# Patient Record
Sex: FEMALE | Race: WHITE | Employment: UNEMPLOYED | URBAN - METROPOLITAN AREA
[De-identification: names, ages, dates, MRNs, and addresses within clinical notes are randomized per-mention and may not be internally consistent; named-entity substitution may affect disease eponyms.]

---

## 2019-05-08 LAB
ANTIBODY SCREEN, EXTERNAL: NEGATIVE
HBSAG, EXTERNAL: NEGATIVE
HCT, EXTERNAL: 37
HGB, EXTERNAL: 12.8
HIV, EXTERNAL: NONREACTIVE
PLATELET CNT,   EXTERNAL: 276
RPR, EXTERNAL: NONREACTIVE
RUBELLA, EXTERNAL: NORMAL
URINALYSIS, EXTERNAL: NEGATIVE

## 2019-10-14 LAB
CHLAMYDIA, EXTERNAL: NEGATIVE
CHLAMYDIA, EXTERNAL: NEGATIVE
GRBS, EXTERNAL: POSITIVE
N. GONORRHEA, EXTERNAL: NEGATIVE
N. GONORRHEA, EXTERNAL: NEGATIVE

## 2019-10-28 LAB — TYPE, ABO & RH, EXTERNAL: NORMAL

## 2019-11-17 ENCOUNTER — HOSPITAL ENCOUNTER (INPATIENT)
Age: 24
LOS: 3 days | Discharge: HOME | DRG: 560 | End: 2019-11-20
Attending: OBSTETRICS & GYNECOLOGY | Admitting: OBSTETRICS & GYNECOLOGY
Payer: MEDICAID

## 2019-11-17 ENCOUNTER — ANESTHESIA (OUTPATIENT)
Dept: LABOR AND DELIVERY | Age: 24
DRG: 560 | End: 2019-11-17
Payer: MEDICAID

## 2019-11-17 ENCOUNTER — ANESTHESIA EVENT (OUTPATIENT)
Dept: LABOR AND DELIVERY | Age: 24
DRG: 560 | End: 2019-11-17
Payer: MEDICAID

## 2019-11-17 PROBLEM — O48.0 POST TERM PREGNANCY OVER 40 WEEKS: Status: ACTIVE | Noted: 2019-11-17

## 2019-11-17 LAB
BASOPHILS # BLD: 0 K/UL (ref 0–0.1)
BASOPHILS NFR BLD: 0 % (ref 0–1)
COMMENT, HOLDF: NORMAL
DIFFERENTIAL METHOD BLD: ABNORMAL
EOSINOPHIL # BLD: 0.1 K/UL (ref 0–0.4)
EOSINOPHIL NFR BLD: 1 % (ref 0–7)
ERYTHROCYTE [DISTWIDTH] IN BLOOD BY AUTOMATED COUNT: 12.9 % (ref 11.5–14.5)
HCT VFR BLD AUTO: 39.2 % (ref 35–47)
HGB BLD-MCNC: 13.1 G/DL (ref 11.5–16)
IMM GRANULOCYTES # BLD AUTO: 0.1 K/UL (ref 0–0.04)
IMM GRANULOCYTES NFR BLD AUTO: 1 % (ref 0–0.5)
LYMPHOCYTES # BLD: 1.3 K/UL (ref 0.8–3.5)
LYMPHOCYTES NFR BLD: 15 % (ref 12–49)
MCH RBC QN AUTO: 29.3 PG (ref 26–34)
MCHC RBC AUTO-ENTMCNC: 33.4 G/DL (ref 30–36.5)
MCV RBC AUTO: 87.7 FL (ref 80–99)
MONOCYTES # BLD: 0.7 K/UL (ref 0–1)
MONOCYTES NFR BLD: 8 % (ref 5–13)
NEUTS SEG # BLD: 6.5 K/UL (ref 1.8–8)
NEUTS SEG NFR BLD: 75 % (ref 32–75)
NRBC # BLD: 0 K/UL (ref 0–0.01)
NRBC BLD-RTO: 0 PER 100 WBC
PLATELET # BLD AUTO: 249 K/UL (ref 150–400)
PMV BLD AUTO: 10.8 FL (ref 8.9–12.9)
RBC # BLD AUTO: 4.47 M/UL (ref 3.8–5.2)
SAMPLES BEING HELD,HOLD: NORMAL
WBC # BLD AUTO: 8.6 K/UL (ref 3.6–11)

## 2019-11-17 PROCEDURE — 75410000003 HC RECOV DEL/VAG/CSECN EA 0.5 HR: Performed by: OBSTETRICS & GYNECOLOGY

## 2019-11-17 PROCEDURE — 76060000078 HC EPIDURAL ANESTHESIA: Performed by: STUDENT IN AN ORGANIZED HEALTH CARE EDUCATION/TRAINING PROGRAM

## 2019-11-17 PROCEDURE — 74011250637 HC RX REV CODE- 250/637: Performed by: OBSTETRICS & GYNECOLOGY

## 2019-11-17 PROCEDURE — 83615 LACTATE (LD) (LDH) ENZYME: CPT

## 2019-11-17 PROCEDURE — 65270000029 HC RM PRIVATE

## 2019-11-17 PROCEDURE — 75410000002 HC LABOR FEE PER 1 HR: Performed by: OBSTETRICS & GYNECOLOGY

## 2019-11-17 PROCEDURE — 74011000250 HC RX REV CODE- 250: Performed by: STUDENT IN AN ORGANIZED HEALTH CARE EDUCATION/TRAINING PROGRAM

## 2019-11-17 PROCEDURE — 74011250636 HC RX REV CODE- 250/636: Performed by: STUDENT IN AN ORGANIZED HEALTH CARE EDUCATION/TRAINING PROGRAM

## 2019-11-17 PROCEDURE — 74011250636 HC RX REV CODE- 250/636: Performed by: OBSTETRICS & GYNECOLOGY

## 2019-11-17 PROCEDURE — 36415 COLL VENOUS BLD VENIPUNCTURE: CPT

## 2019-11-17 PROCEDURE — 80053 COMPREHEN METABOLIC PANEL: CPT

## 2019-11-17 PROCEDURE — 74011000258 HC RX REV CODE- 258: Performed by: OBSTETRICS & GYNECOLOGY

## 2019-11-17 PROCEDURE — 85025 COMPLETE CBC W/AUTO DIFF WBC: CPT

## 2019-11-17 PROCEDURE — 84156 ASSAY OF PROTEIN URINE: CPT

## 2019-11-17 PROCEDURE — 75410000000 HC DELIVERY VAGINAL/SINGLE: Performed by: OBSTETRICS & GYNECOLOGY

## 2019-11-17 PROCEDURE — 84550 ASSAY OF BLOOD/URIC ACID: CPT

## 2019-11-17 RX ORDER — SODIUM CHLORIDE 0.9 % (FLUSH) 0.9 %
5-40 SYRINGE (ML) INJECTION EVERY 8 HOURS
Status: DISCONTINUED | OUTPATIENT
Start: 2019-11-17 | End: 2019-11-19

## 2019-11-17 RX ORDER — NALOXONE HYDROCHLORIDE 0.4 MG/ML
0.4 INJECTION, SOLUTION INTRAMUSCULAR; INTRAVENOUS; SUBCUTANEOUS ONCE
Status: ACTIVE | OUTPATIENT
Start: 2019-11-17 | End: 2019-11-18

## 2019-11-17 RX ORDER — LIDOCAINE HYDROCHLORIDE AND EPINEPHRINE 15; 5 MG/ML; UG/ML
INJECTION, SOLUTION EPIDURAL
Status: SHIPPED | OUTPATIENT
Start: 2019-11-17 | End: 2019-11-17

## 2019-11-17 RX ORDER — MINERAL OIL
30 OIL (ML) ORAL
Status: COMPLETED | OUTPATIENT
Start: 2019-11-17 | End: 2019-11-18

## 2019-11-17 RX ORDER — CALCIUM CARBONATE 200(500)MG
400 TABLET,CHEWABLE ORAL
Status: DISCONTINUED | OUTPATIENT
Start: 2019-11-17 | End: 2019-11-18 | Stop reason: HOSPADM

## 2019-11-17 RX ORDER — ACETAMINOPHEN 325 MG/1
650 TABLET ORAL
Status: DISCONTINUED | OUTPATIENT
Start: 2019-11-17 | End: 2019-11-18

## 2019-11-17 RX ORDER — OXYTOCIN/0.9 % SODIUM CHLORIDE 30/500 ML
0-25 PLASTIC BAG, INJECTION (ML) INTRAVENOUS
Status: DISCONTINUED | OUTPATIENT
Start: 2019-11-18 | End: 2019-11-20 | Stop reason: HOSPADM

## 2019-11-17 RX ORDER — EPHEDRINE SULFATE/0.9% NACL/PF 50 MG/5 ML
10 SYRINGE (ML) INTRAVENOUS
Status: DISCONTINUED | OUTPATIENT
Start: 2019-11-17 | End: 2019-11-18

## 2019-11-17 RX ORDER — LANOLIN ALCOHOL/MO/W.PET/CERES
CREAM (GRAM) TOPICAL
COMMUNITY
End: 2019-11-20

## 2019-11-17 RX ORDER — NALBUPHINE HYDROCHLORIDE 10 MG/ML
10 INJECTION, SOLUTION INTRAMUSCULAR; INTRAVENOUS; SUBCUTANEOUS ONCE
Status: COMPLETED | OUTPATIENT
Start: 2019-11-17 | End: 2019-11-17

## 2019-11-17 RX ORDER — SODIUM CHLORIDE 0.9 % (FLUSH) 0.9 %
5-40 SYRINGE (ML) INJECTION AS NEEDED
Status: DISCONTINUED | OUTPATIENT
Start: 2019-11-17 | End: 2019-11-20 | Stop reason: HOSPADM

## 2019-11-17 RX ORDER — FENTANYL/BUPIVACAINE/NS/PF 2-1250MCG
1-16 PREFILLED PUMP RESERVOIR EPIDURAL CONTINUOUS
Status: DISCONTINUED | OUTPATIENT
Start: 2019-11-17 | End: 2019-11-18

## 2019-11-17 RX ORDER — SODIUM CHLORIDE, SODIUM LACTATE, POTASSIUM CHLORIDE, CALCIUM CHLORIDE 600; 310; 30; 20 MG/100ML; MG/100ML; MG/100ML; MG/100ML
125 INJECTION, SOLUTION INTRAVENOUS CONTINUOUS
Status: DISCONTINUED | OUTPATIENT
Start: 2019-11-17 | End: 2019-11-20 | Stop reason: HOSPADM

## 2019-11-17 RX ORDER — LIDOCAINE HYDROCHLORIDE 10 MG/ML
20 INJECTION INFILTRATION; PERINEURAL
Status: COMPLETED | OUTPATIENT
Start: 2019-11-17 | End: 2019-11-18

## 2019-11-17 RX ADMIN — NALBUPHINE HYDROCHLORIDE 10 MG: 10 INJECTION, SOLUTION INTRAMUSCULAR; INTRAVENOUS; SUBCUTANEOUS at 17:13

## 2019-11-17 RX ADMIN — PENICILLIN G POTASSIUM 2.5 MILLION UNITS: 20000000 POWDER, FOR SOLUTION INTRAVENOUS at 21:45

## 2019-11-17 RX ADMIN — SODIUM CHLORIDE, SODIUM LACTATE, POTASSIUM CHLORIDE, AND CALCIUM CHLORIDE 1000 ML: 600; 310; 30; 20 INJECTION, SOLUTION INTRAVENOUS at 20:45

## 2019-11-17 RX ADMIN — SODIUM CHLORIDE, SODIUM LACTATE, POTASSIUM CHLORIDE, AND CALCIUM CHLORIDE 125 ML/HR: 600; 310; 30; 20 INJECTION, SOLUTION INTRAVENOUS at 22:59

## 2019-11-17 RX ADMIN — ACETAMINOPHEN 650 MG: 325 TABLET ORAL at 16:12

## 2019-11-17 RX ADMIN — Medication 12 ML/HR: at 21:31

## 2019-11-17 RX ADMIN — LIDOCAINE HYDROCHLORIDE AND EPINEPHRINE 3.5 ML: 15; 5 INJECTION, SOLUTION EPIDURAL at 21:28

## 2019-11-17 RX ADMIN — SODIUM CHLORIDE, SODIUM LACTATE, POTASSIUM CHLORIDE, AND CALCIUM CHLORIDE 999 ML/HR: 600; 310; 30; 20 INJECTION, SOLUTION INTRAVENOUS at 16:09

## 2019-11-17 RX ADMIN — SODIUM CHLORIDE 5 MILLION UNITS: 900 INJECTION, SOLUTION INTRAVENOUS at 16:08

## 2019-11-17 RX ADMIN — SODIUM CHLORIDE, SODIUM LACTATE, POTASSIUM CHLORIDE, AND CALCIUM CHLORIDE 125 ML/HR: 600; 310; 30; 20 INJECTION, SOLUTION INTRAVENOUS at 17:41

## 2019-11-17 RX ADMIN — PROMETHAZINE HYDROCHLORIDE 12.5 MG: 25 INJECTION INTRAMUSCULAR; INTRAVENOUS at 17:13

## 2019-11-17 NOTE — ANESTHESIA PREPROCEDURE EVALUATION
Relevant Problems   No relevant active problems       Anesthetic History   No history of anesthetic complications            Review of Systems / Medical History  Patient summary reviewed, nursing notes reviewed and pertinent labs reviewed    Pulmonary  Within defined limits            Pertinent negatives: No asthma and recent URI     Neuro/Psych   Within defined limits        Pertinent negatives: No seizures  Comments: Autism spectrum disorder Cardiovascular  Within defined limits              Pertinent negatives: No hypertension  Exercise tolerance: >4 METS     GI/Hepatic/Renal     GERD: well controlled           Endo/Other  Within defined limits           Other Findings              Physical Exam    Airway  Mallampati: II  TM Distance: 4 - 6 cm  Neck ROM: normal range of motion   Mouth opening: Normal     Cardiovascular  Regular rate and rhythm,  S1 and S2 normal,  no murmur, click, rub, or gallop             Dental    Dentition: Lower dentition intact and Upper dentition intact     Pulmonary  Breath sounds clear to auscultation               Abdominal         Other Findings            Anesthetic Plan    ASA: 2  Anesthesia type: epidural            Anesthetic plan and risks discussed with: Patient

## 2019-11-17 NOTE — PROGRESS NOTES
1506:  Pt arrives for r/o labor. Placed in room 208, allowed time to undress, don pt gown. 1510:  Pt verbalizes: UCx started around 10:30 AM and are 2-3 minutes apart since 1245 PM.  Pt is scheduled for Cervical Ripening/Induction this afternoon/tomorrow. Oriented to monitoring, EFM applied  SVE 2/95/-2     (Last office visit was 11/11/19, pt was told she was 1 cm and not effaced.)    1518:  VSS    Pt accompanied by Mother, spouse. PT mother verbalizes pt has Autism, is often slower to answer questions due to increased processing time. Pt verbalizes desire for epidural, delayed cord clamping, spouse to cut cord Summer Darling)    [id number]:  Pt up to Bathroom at this time  [phone]:  Pt returns to bed, This RN to bedside to adjust EFM at this time  1606:  Pt verbalizes laying down hurts too much, she needs to get up. Pt out of bed, standing/swaying, sitting intermittently to work through UCx pains. 1609:  See MAR, Medications administered  1625:  Dr Manoj Diaz notified of pt arrival, interventions, assessment. NARINDER Diaz, RN at bedside assisting with admission. 1630:  Dr Cora Griffin to bedside to discuss epidural plan of care/assess patient  1700:  Dr Manoj Diaz to bedside to evaluate/greet patient  [phone]:  Admission completed, Prenatals reviewed, entered by RN Gilbert/NGOC Santiago  1740:  Pt bed to Recliner position, pt in left tilt, Extra pillows provided ,EFM adjusted  Pt verbalizes relief of pain, encouraged to close eyes and rest at this time. [id number]:  Pt assisted to and from bathroom, voiding freely. Pt returns to bed, EFM adjusted, pt resting in left tilt, family at bedside. 1900:  SBAR to [facility], RN; Bedside handoff and plan of care reviewed with handoff. Family remains present and supportive.

## 2019-11-17 NOTE — H&P
Labor and Delivery Admission Note  11/17/2019    27 y.o., , female, G1 P 0 Estimated Date of Delivery: 11/10/19 by dates and US presents with painful regular uterine contractions at 1506. Contractions started at 1030 this morning. Denies LOF.  +Mucous. Reports good fetal movement, no bleeding.  +N. No V. Patient was scheduled for 41 weeks IOL/cervical ripening this evening. PNL: Blood type: O            RH: pos            Rubella: immune            SVII serology: NR             GBS status: Positive    Past Medical History:   Diagnosis Date    Abnormal Papanicolaou smear of cervix     ADHD     Anemia     Autism     HPV (human papilloma virus) anogenital infection      Past Surgical History:   Procedure Laterality Date    HX OTHER SURGICAL      LEEP IN 2015        OB/GYN: G1    Meds: PNV  Current Facility-Administered Medications   Medication Dose Route Frequency    sodium chloride (NS) flush 5-40 mL  5-40 mL IntraVENous Q8H    sodium chloride (NS) flush 5-40 mL  5-40 mL IntraVENous PRN    penicillin G pot (PFIZERPEN) 2.5 Million Units in 50 ml 0.9% NaCl  2.5 Million Units IntraVENous Q4H    calcium carbonate (TUMS) chewable tablet 400 mg [elemental]  400 mg Oral PC PRN    acetaminophen (TYLENOL) tablet 650 mg  650 mg Oral Q4H PRN    mineral oil 30 mL  30 mL Topical Once at Delivery    lidocaine (XYLOCAINE) 10 mg/mL (1 %) injection 20 mL  20 mL IntraDERMal ONCE PRN    lactated Ringers infusion  125 mL/hr IntraVENous CONTINUOUS    lactated ringers bolus infusion 1,000 mL  1,000 mL IntraVENous ONCE    lactated ringers bolus infusion 500 mL  500 mL IntraVENous ONCE    fentaNYL 2mcg/mL - bupivacaine 0.125% pf epidural  1-16 mL/hr Epidural CONTINUOUS    naloxone (NARCAN) injection 0.4 mg  0.4 mg IntraVENous ONCE    ePHEDrine (PF) (MISTOLE) 10 mg/mL in NS syringe 10 mg  10 mg IntraVENous ONCE PRN     Allergies: No Known Allergies     Pertinent ROS: Denies F/C. Denies CP/Palp.   Denies cough/wheeze. Denies sore throat/congestion. Denies HA/vision changes/RUQ pain. Denies edema. Denies constipation/diarrhea. Denies dys/urg/freq. FMHX:  Non-contributory    Social History     Socioeconomic History    Marital status:      Spouse name: Not on file    Number of children: Not on file    Years of education: Not on file    Highest education level: Not on file   Tobacco Use    Smoking status: Never Smoker    Smokeless tobacco: Never Used   Substance and Sexual Activity    Alcohol use: Never     Frequency: Never    Drug use: Never    Sexual activity: Yes     Partners: Male   Lifestyle    Physical activity:     Days per week: Not on file     Minutes per session: Not on file    Stress: Not on file       OBJECTIVE:  Gravid , female NAD  Temp (24hrs), Av.8 °F (37.1 °C), Min:98.8 °F (37.1 °C), Max:98.8 °F (37.1 °C)    Visit Vitals  /86   Pulse 94   Temp 98.8 °F (37.1 °C)   Resp 14   Ht 5' 9\" (1.753 m)   Wt 89.4 kg (197 lb)   Breastfeeding? Yes   BMI 29.09 kg/m²       Exam:  HEENT:  normal   Lungs:  clear  Cor:  RRR  Abdomen:  Fundal height 39                    Soft between UC                    Clinical EFW 7.5#  Fetal heart rate tracin baseline, moderate variability, +accels, no decels, cat I/reactive  Contraction pattern: q 2-3 min spontaneously, palpate moderate   Cervix:  2/95/-2  Fluid:  Intact      Impression:  28 yo G1 at 41+0 scheduled for IOL today presents in early labor. Reassuring fetal status. GBS pos. Plan:   1. Admit for delivery  2. PIV/CBC/Type&Rh  3. PCN GBS proph  4.  Epidural prn  5. AROM/Augment prn  5. Anticipate vaginal delivery.  section for standard fetal maternal indications.     Bhavana Leon MD

## 2019-11-18 LAB
ALBUMIN SERPL-MCNC: 2.8 G/DL (ref 3.5–5)
ALBUMIN/GLOB SERPL: 1 {RATIO} (ref 1.1–2.2)
ALP SERPL-CCNC: 245 U/L (ref 45–117)
ALT SERPL-CCNC: 31 U/L (ref 12–78)
ANION GAP SERPL CALC-SCNC: 11 MMOL/L (ref 5–15)
ANION GAP SERPL CALC-SCNC: 9 MMOL/L (ref 5–15)
AST SERPL-CCNC: 21 U/L (ref 15–37)
BILIRUB SERPL-MCNC: 0.7 MG/DL (ref 0.2–1)
BUN SERPL-MCNC: 7 MG/DL (ref 6–20)
BUN SERPL-MCNC: 9 MG/DL (ref 6–20)
BUN/CREAT SERPL: 12 (ref 12–20)
BUN/CREAT SERPL: 19 (ref 12–20)
CALCIUM SERPL-MCNC: 8.4 MG/DL (ref 8.5–10.1)
CALCIUM SERPL-MCNC: 8.9 MG/DL (ref 8.5–10.1)
CHLORIDE SERPL-SCNC: 109 MMOL/L (ref 97–108)
CHLORIDE SERPL-SCNC: 111 MMOL/L (ref 97–108)
CO2 SERPL-SCNC: 19 MMOL/L (ref 21–32)
CO2 SERPL-SCNC: 19 MMOL/L (ref 21–32)
CREAT SERPL-MCNC: 0.48 MG/DL (ref 0.55–1.02)
CREAT SERPL-MCNC: 0.57 MG/DL (ref 0.55–1.02)
CREAT UR-MCNC: 95.7 MG/DL
GLOBULIN SER CALC-MCNC: 2.8 G/DL (ref 2–4)
GLUCOSE SERPL-MCNC: 104 MG/DL (ref 65–100)
GLUCOSE SERPL-MCNC: 146 MG/DL (ref 65–100)
LDH SERPL L TO P-CCNC: 137 U/L (ref 81–246)
PHOSPHATE SERPL-MCNC: 2.8 MG/DL (ref 2.6–4.7)
POTASSIUM SERPL-SCNC: 3.6 MMOL/L (ref 3.5–5.1)
POTASSIUM SERPL-SCNC: 3.8 MMOL/L (ref 3.5–5.1)
PROT SERPL-MCNC: 5.6 G/DL (ref 6.4–8.2)
PROT UR-MCNC: 27 MG/DL (ref 0–11.9)
PROT/CREAT UR-RTO: 0.3
SODIUM SERPL-SCNC: 139 MMOL/L (ref 136–145)
SODIUM SERPL-SCNC: 139 MMOL/L (ref 136–145)
THERAPEUTIC MAGNESI,THMG: 1.7 MG/DL (ref 4.8–8.4)
TSH SERPL DL<=0.05 MIU/L-ACNC: 1.38 UIU/ML (ref 0.36–3.74)
URATE SERPL-MCNC: 3.7 MG/DL (ref 2.6–6)

## 2019-11-18 PROCEDURE — 80048 BASIC METABOLIC PNL TOTAL CA: CPT

## 2019-11-18 PROCEDURE — 65270000029 HC RM PRIVATE

## 2019-11-18 PROCEDURE — 74011250636 HC RX REV CODE- 250/636: Performed by: OBSTETRICS & GYNECOLOGY

## 2019-11-18 PROCEDURE — 84443 ASSAY THYROID STIM HORMONE: CPT

## 2019-11-18 PROCEDURE — 74011250637 HC RX REV CODE- 250/637: Performed by: OBSTETRICS & GYNECOLOGY

## 2019-11-18 PROCEDURE — 36415 COLL VENOUS BLD VENIPUNCTURE: CPT

## 2019-11-18 PROCEDURE — 93005 ELECTROCARDIOGRAM TRACING: CPT

## 2019-11-18 PROCEDURE — 84100 ASSAY OF PHOSPHORUS: CPT

## 2019-11-18 PROCEDURE — 75410000002 HC LABOR FEE PER 1 HR: Performed by: OBSTETRICS & GYNECOLOGY

## 2019-11-18 PROCEDURE — 0KQM0ZZ REPAIR PERINEUM MUSCLE, OPEN APPROACH: ICD-10-PCS | Performed by: OBSTETRICS & GYNECOLOGY

## 2019-11-18 PROCEDURE — 74011000250 HC RX REV CODE- 250: Performed by: OBSTETRICS & GYNECOLOGY

## 2019-11-18 PROCEDURE — 74011000250 HC RX REV CODE- 250: Performed by: STUDENT IN AN ORGANIZED HEALTH CARE EDUCATION/TRAINING PROGRAM

## 2019-11-18 PROCEDURE — 00HU33Z INSERTION OF INFUSION DEVICE INTO SPINAL CANAL, PERCUTANEOUS APPROACH: ICD-10-PCS | Performed by: STUDENT IN AN ORGANIZED HEALTH CARE EDUCATION/TRAINING PROGRAM

## 2019-11-18 PROCEDURE — 83735 ASSAY OF MAGNESIUM: CPT

## 2019-11-18 RX ORDER — DIPHENHYDRAMINE HCL 25 MG
25 CAPSULE ORAL
Status: DISCONTINUED | OUTPATIENT
Start: 2019-11-18 | End: 2019-11-20 | Stop reason: HOSPADM

## 2019-11-18 RX ORDER — IBUPROFEN 800 MG/1
800 TABLET ORAL EVERY 8 HOURS
Status: DISCONTINUED | OUTPATIENT
Start: 2019-11-18 | End: 2019-11-18

## 2019-11-18 RX ORDER — ACETAMINOPHEN 325 MG/1
650 TABLET ORAL
Status: DISCONTINUED | OUTPATIENT
Start: 2019-11-18 | End: 2019-11-20 | Stop reason: HOSPADM

## 2019-11-18 RX ORDER — NALOXONE HYDROCHLORIDE 0.4 MG/ML
0.4 INJECTION, SOLUTION INTRAMUSCULAR; INTRAVENOUS; SUBCUTANEOUS AS NEEDED
Status: DISCONTINUED | OUTPATIENT
Start: 2019-11-18 | End: 2019-11-20 | Stop reason: HOSPADM

## 2019-11-18 RX ORDER — MAG HYDROX/ALUMINUM HYD/SIMETH 200-200-20
30 SUSPENSION, ORAL (FINAL DOSE FORM) ORAL
Status: DISCONTINUED | OUTPATIENT
Start: 2019-11-18 | End: 2019-11-20 | Stop reason: HOSPADM

## 2019-11-18 RX ORDER — LANOLIN ALCOHOL/MO/W.PET/CERES
400 CREAM (GRAM) TOPICAL 2 TIMES DAILY
Status: DISCONTINUED | OUTPATIENT
Start: 2019-11-18 | End: 2019-11-20

## 2019-11-18 RX ORDER — SIMETHICONE 80 MG
80 TABLET,CHEWABLE ORAL
Status: DISCONTINUED | OUTPATIENT
Start: 2019-11-18 | End: 2019-11-20 | Stop reason: HOSPADM

## 2019-11-18 RX ORDER — FACIAL-BODY WIPES
10 EACH TOPICAL DAILY PRN
Status: DISCONTINUED | OUTPATIENT
Start: 2019-11-18 | End: 2019-11-20 | Stop reason: HOSPADM

## 2019-11-18 RX ORDER — IBUPROFEN 800 MG/1
800 TABLET ORAL EVERY 8 HOURS
Status: DISCONTINUED | OUTPATIENT
Start: 2019-11-18 | End: 2019-11-20 | Stop reason: HOSPADM

## 2019-11-18 RX ORDER — ONDANSETRON 4 MG/1
4 TABLET, ORALLY DISINTEGRATING ORAL
Status: DISCONTINUED | OUTPATIENT
Start: 2019-11-18 | End: 2019-11-20 | Stop reason: HOSPADM

## 2019-11-18 RX ORDER — OXYTOCIN/0.9 % SODIUM CHLORIDE 20/1000 ML
125-500 PLASTIC BAG, INJECTION (ML) INTRAVENOUS ONCE
Status: ACTIVE | OUTPATIENT
Start: 2019-11-18 | End: 2019-11-19

## 2019-11-18 RX ORDER — HYDROCORTISONE ACETATE PRAMOXINE HCL 2.5; 1 G/100G; G/100G
CREAM TOPICAL AS NEEDED
Status: DISCONTINUED | OUTPATIENT
Start: 2019-11-18 | End: 2019-11-20 | Stop reason: HOSPADM

## 2019-11-18 RX ORDER — ONDANSETRON 2 MG/ML
4 INJECTION INTRAMUSCULAR; INTRAVENOUS
Status: DISCONTINUED | OUTPATIENT
Start: 2019-11-18 | End: 2019-11-20 | Stop reason: HOSPADM

## 2019-11-18 RX ORDER — OXYCODONE AND ACETAMINOPHEN 5; 325 MG/1; MG/1
1 TABLET ORAL
Status: DISCONTINUED | OUTPATIENT
Start: 2019-11-18 | End: 2019-11-20 | Stop reason: HOSPADM

## 2019-11-18 RX ADMIN — ALUMINUM HYDROXIDE, MAGNESIUM HYDROXIDE, AND SIMETHICONE 30 ML: 200; 200; 20 SUSPENSION ORAL at 03:59

## 2019-11-18 RX ADMIN — Medication 400 MG: at 15:39

## 2019-11-18 RX ADMIN — Medication 999 MILLI-UNITS/MIN: at 10:08

## 2019-11-18 RX ADMIN — SODIUM CHLORIDE, SODIUM LACTATE, POTASSIUM CHLORIDE, AND CALCIUM CHLORIDE 125 ML/HR: 600; 310; 30; 20 INJECTION, SOLUTION INTRAVENOUS at 08:30

## 2019-11-18 RX ADMIN — PENICILLIN G POTASSIUM 2.5 MILLION UNITS: 20000000 POWDER, FOR SOLUTION INTRAVENOUS at 04:02

## 2019-11-18 RX ADMIN — Medication 12 ML/HR: at 08:17

## 2019-11-18 RX ADMIN — LIDOCAINE HYDROCHLORIDE 10 ML: 10 INJECTION, SOLUTION INFILTRATION; PERINEURAL at 10:16

## 2019-11-18 RX ADMIN — Medication 10 ML: at 15:40

## 2019-11-18 RX ADMIN — PENICILLIN G POTASSIUM 2.5 MILLION UNITS: 20000000 POWDER, FOR SOLUTION INTRAVENOUS at 01:07

## 2019-11-18 RX ADMIN — OXYCODONE HYDROCHLORIDE AND ACETAMINOPHEN 1 TABLET: 5; 325 TABLET ORAL at 19:27

## 2019-11-18 RX ADMIN — PENICILLIN G POTASSIUM 2.5 MILLION UNITS: 20000000 POWDER, FOR SOLUTION INTRAVENOUS at 09:04

## 2019-11-18 RX ADMIN — IBUPROFEN 800 MG: 800 TABLET ORAL at 20:23

## 2019-11-18 RX ADMIN — Medication 12 ML/HR: at 03:04

## 2019-11-18 RX ADMIN — MINERAL OIL 30 ML: 1000 SOLUTION ORAL at 09:19

## 2019-11-18 RX ADMIN — IBUPROFEN 800 MG: 800 TABLET ORAL at 12:32

## 2019-11-18 RX ADMIN — SIMETHICONE CHEW TAB 80 MG 80 MG: 80 TABLET ORAL at 19:27

## 2019-11-18 RX ADMIN — ONDANSETRON 4 MG: 2 INJECTION INTRAMUSCULAR; INTRAVENOUS at 04:00

## 2019-11-18 NOTE — PROGRESS NOTES
Patient doing well. Feeling pressure. Epidural dosed, not feeling any pain. Visit Vitals  /67   Pulse (!) 124   Temp 98.2 °F (36.8 °C)   Resp 18   Ht 5' 9\" (1.753 m)   Wt 89.4 kg (197 lb)   SpO2 99%   Breastfeeding? Yes   BMI 29.09 kg/m²       CEFM: cat 1, baseline 145bpm, +Accels, no decelerations, mod variability  [place]: irregular contraction pattern q2-6 minutes apart    PIH labs: Pr/cr 0.3    A/P 25yo G1 at 41w1d admitted in term labor, now completely dilated. New diagnosis pre E without severe features.      - start to push  - CEFM, monitor BPs  - cont PCN for GBS ppx.    - RI, RH +    Dispo: expectant delivery    Alex Edwards MD

## 2019-11-18 NOTE — PROGRESS NOTES
1900: SBAR report received from MINA Hunt RN. 2014: Dr. Concepción Serrano notified of [address] and pt requesting epidural. Ok to proceed with epidural at this time per Dr. Concepción Serrano. 2109: Dr. Demarcus Evans at bedside assessing patient for epidural placement. Pt assisted to sitting position at side of bed. RN assisting with positioning. 2127: Pt assisted to the left lateral position after epidural placement. Pt tolerated procedure well. EFM readjusted. 2144: Pt c/o pain in right hip and leg. Pt repositioned to right lateral position and epidural PCA button pushed. 2300: Pt sleeping with even respirations. 2324: Orders to start Pitocin at 2mu/hr and do not increase rate throughout the night per Dr. Concepción Serrano. 2340: Orders to recheck cervix prior to starting Pitocin and notify MD.    2353: Dr. Concepción Serrano notified of [address]. Orders to not start Pitocin at this time. 0222: Pt c/o vaginal discomfort. States has been using epidural PCA button with minimal relief. Requesting epidural redose. 0229: Dr. Demarcus Evans at bedside for epidural redose. 0300: Pt reports relief from discomfort after epidural redose. 3725: Pt c/o feeling urge to have BM and feeling intense vaginal pain. SVE performed. Pt SROM during vaginal exam. Pt requesting not to change positions or begin pushing until she is able to be redosed by anesthesia. 9416: Dr. Andree Seo notified pt SROM and c/c/+1. Dr. Andree Seo states she is on her way in.    0645: Dr. Demarcus Evans at bedside for epidural redose. 0700: SBAR report given to PATRICIA Peters RN and Azra Soler RN.

## 2019-11-18 NOTE — L&D DELIVERY NOTE
Delivery Summary    Patient: Nolvia Paulino MRN: [id number]  SSN: [id number]    YOB: 1995  Age: 27 y.o. Sex: female       Information for the patient's :  Vivian Ontiveros, Female Genora Crews [[id number]]       Labor Events:    Labor: No    Steroids: None   Cervical Ripening Date/Time:       Cervical Ripening Type: None   Antibiotics During Labor: Yes   Rupture Identifier:      Rupture Date/Time: 2019 6:32 AM   Rupture Type: SROM   Amniotic Fluid Volume: Large    Amniotic Fluid Description: Clear    Amniotic Fluid Odor:      Induction: None       Induction Date/Time:        Indications for Induction:      Augmentation: None   Augmentation Date/Time:      Indications for Augmentation:     Labor complications: None       Additional complications:        Delivery Events:  Indications For Episiotomy:     Episiotomy: None   Perineal Laceration(s):     Repaired:     Periurethral Laceration Location:      Repaired:     Labial Laceration Location:     Repaired:     Sulcal Laceration Location:     Repaired:     Vaginal Laceration Location:     Repaired:     Cervical Laceration Location:     Repaired:     Repair Suture: Monocryl 3-0;Vicryl 3-0   Number of Repair Packets: 2   Estimated Blood Loss (ml):  ml     Delivery Date: 2019    Delivery Time: 10:02 AM  Delivery Type: Vaginal, Spontaneous  Sex:  Female    Gestational Age: 40w1d   Delivery Clinician:  Guerline Stubbs  Living Status: Living   Delivery Location: L&D 208          APGARS  One minute Five minutes Ten minutes   Skin color: 1   1        Heart rate: 2   2        Grimace: 2   2        Muscle tone: 2   2        Breathin   2        Totals: 9   9            Presentation: Vertex    Position:   Occiput Anterior  Resuscitation Method:  Suctioning-bulb; Tactile Stimulation     Meconium Stained: None      Cord Information: 3 Vessels  Complications: None  Cord around:    Delayed cord clamping?  Yes  Cord clamped date/time:2019 10:04 AM  Disposition of Cord Blood: Lab    Blood Gases Sent?: No    Placenta:  Date/Time: 2019 10:08 AM  Removal: Expressed      Appearance: Normal;Intact     [place] Measurements:  Birth Weight:        Birth Length:        Head Circumference:        Chest Circumference:       Abdominal Girth: Other Providers:   JOSY Dodson;SHARON DENSON;CLINTON ANDINO;VASQUEZ BARTON, Obstetrician;Primary Nurse;Primary  Nurse;Staff Nurse;Nursery Nurse           Group B Strep:   Lab Results   Component Value Date/Time    GrBStrep, External POSITIVE 10/14/2019     Information for the patient's :  Dominique Rodriguez, Female Brittani Lu [[id number]]   No results found for: ABORH, PCTABR, PCTDIG, BILI, ABORHEXT, ABORH    No results for input(s): PCO2CB, PO2CB, HCO3I, SO2I, IBD, PTEMPI, SPECTI, PHICB, ISITE, IDEV, IALLEN in the last 72 hours. Patient C/C/+1. She pushed for approx 3 hours with spontaneous delivery of the fetal head in the KINGS position. Anterior/posterior shoulder followed, then spontaneous delivery of the body. Infant placed on maternal chest.  Cord was doubly clamped and cut after cessation of pulse. Cord gas sent. Placenta delivered uneventfully within 5 minutes. Fundal massage with firm fundus. Left labial laceration to the left hymen not hemostatic therefore reapproximated with good hemostasis- 3-0 monocryl stitch. Second degree repaired with 3-0 vicryl stitch with great reapproximation and hemostasis. Uterus was again palpated and U-2. Straight cath for 400cc clear urine.

## 2019-11-18 NOTE — ROUTINE PROCESS
This RN orienting SULTANA Pitts, [address]: Dr. Sary Allan postpartum order set and 800mg ibprofen q 8

## 2019-11-18 NOTE — PROGRESS NOTES
Pt came to floor with mews of 4. When I did her vitals  Mews is a 2. Teaching provided to pt and FOB about elevated HR and anxiety. Oriented to room. 1900 Bedside shift change report given to Makayla Hunter RN (oncoming nurse) by Alina Mcconnell RN (offgoing nurse). Report included the following information SBAR, Kardex, MAR and Recent Results.

## 2019-11-18 NOTE — PROGRESS NOTES
Labs c/w pre-eclampsia without severe features (normal except for elevated Pr:Cr). No indication for Mag Sulfate at this time. Results for Hattie Emery (MRN [id number]) as of 11/18/2019 04:34   Ref. Range 11/17/2019 23:49   Sodium Latest Ref Range: 136 - 145 mmol/L 139   Potassium Latest Ref Range: 3.5 - 5.1 mmol/L 3.8   Chloride Latest Ref Range: 97 - 108 mmol/L 109 (H)   CO2 Latest Ref Range: 21 - 32 mmol/L 19 (L)   Anion gap Latest Ref Range: 5 - 15 mmol/L 11   Glucose Latest Ref Range: 65 - 100 mg/dL 104 (H)   BUN Latest Ref Range: 6 - 20 MG/DL 9   Creatinine Latest Ref Range: 0.55 - 1.02 MG/DL 0.48 (L)   BUN/Creatinine ratio Latest Ref Range: 12 - 20   19   Calcium Latest Ref Range: 8.5 - 10.1 MG/DL 8.9   GFR est non-AA Latest Ref Range: >60 ml/min/1.73m2 >60   GFR est AA Latest Ref Range: >60 ml/min/1.73m2 >60   Bilirubin, total Latest Ref Range: 0.2 - 1.0 MG/DL 0.7   Protein, total Latest Ref Range: 6.4 - 8.2 g/dL 5.6 (L)   Albumin Latest Ref Range: 3.5 - 5.0 g/dL 2.8 (L)   Globulin Latest Ref Range: 2.0 - 4.0 g/dL 2.8   A-G Ratio Latest Ref Range: 1.1 - 2.2   1.0 (L)   ALT (SGPT) Latest Ref Range: 12 - 78 U/L 31   AST Latest Ref Range: 15 - 37 U/L 21   Alk. phosphatase Latest Ref Range: 45 - 117 U/L 245 (H)   LD Latest Ref Range: 81 - 246 U/L 137   Uric acid Latest Ref Range: 2.6 - 6.0 MG/DL 3.7   Creatinine, urine Latest Units: mg/dL 95.70   Protein, urine random Latest Ref Range: 0.0 - 11.9 mg/dL 27 (H)   Protein/Creat.  urine Ratio Latest Units:   0.3   PROTEIN/CREATININE RATIO, URINE Unknown Rpt (A)

## 2019-11-18 NOTE — ANESTHESIA PROCEDURE NOTES
Epidural Block    Start time: 11/17/2019 9:15 PM  End time: 11/17/2019 9:29 PM  Performed by: Stephen Irwin DO  Authorized by: Stephen Irwin DO     Pre-Procedure  Indication: labor epidural    Preanesthetic Checklist: patient identified, risks and benefits discussed, anesthesia consent, site marked, patient being monitored, timeout performed and anesthesia consent      Epidural:   Patient position:  Seated  Prep region:  Lumbar  Prep: Patient draped and Chlorhexidine    Location:  L2-3    Needle and Epidural Catheter:   Needle Type:  Tuohy  Needle Gauge:  17 G  Injection Technique:  Loss of resistance using saline  Attempts:  1  Catheter Size:  20 G  Catheter at Skin Depth (cm):  7  Depth in Epidural Space (cm):  5  Events: no blood with aspiration, no cerebrospinal fluid with aspiration, no paresthesia and negative aspiration test    Test Dose:  Negative    Assessment:   Catheter Secured:  Tegaderm and tape  Insertion:  Uncomplicated  Patient tolerance:  Patient tolerated the procedure well with no immediate complications

## 2019-11-18 NOTE — PROGRESS NOTES
Patient with tachycardia 90-120s since admission. Once in 130s. Postpartum persistently in 100-120s. Patient asymptomatic. Denies SOB, chest pain, lightheadedness, palpitations. Has walked to the bathroom without lightheadedness. Has voided since delivery. Bleeding appropriate postpartum. Temp:  [98.1 °F (36.7 °C)-98.8 °F (37.1 °C)]   Pulse (Heart Rate):  []   BP: ()/()   Resp Rate:  [14-18]   O2 Sat (%):  [92 %-100 %]   Weight:  [89.4 kg (197 lb)]      Visit Vitals  /65 (BP 1 Location: Right arm, BP Patient Position: Sitting)   Pulse (!) 120   Temp 98.4 °F (36.9 °C)   Resp 16   Ht 5' 9\" (1.753 m)   Wt 89.4 kg (197 lb)   SpO2 98%   Breastfeeding? Yes   BMI 29.09 kg/m²         Exam:   NAD  Tachycardic with regular rhythm at 110bpm  LCTAB, nonlabored. RR 16  Abd soft, appropriately tender with Fundus at U-2   exam: no palpitation of vaginal hematoma. Appropriately tender. Legs without swelling. Symmetric. No calf tenderness. Neg homans sign. EKG: Sinus tachycardia,     A/P: 23yo  PPD0 s/p TSVD c/b PreE without severe features and tachycardia. Tachycardia: unclear if new, no HR in medical records. Mother states patient is very excitable and anxious. Has autism and can get very 'stimulated'. Patient asymptomatic in no apparent distress. Bleeding appropriate and voiding expectedly. Hgb on admission 13.1 and no evidence of vaginal hematoma on exam.    Given exam and lack of other clinical findings, low concern for DVT/PE at this time. EKG with sinus tachycardia. Will check electrolytes and TSH.       Izabella Huntley MD

## 2019-11-18 NOTE — LACTATION NOTE
This note was copied from a baby's chart. Mother put baby to breast after delivery. Baby was latched on and breastfeeding well on right breast. LC reviewed feeding cues and breastfeeding basics. Mother receptive to instruction and looked comfortable breastfeeding her baby. Discussed with mother her plan for feeding. Reviewed the benefits of exclusive breast milk feeding during the hospital stay. Informed her of the risks of using formula to supplement in the first few days of life as well as the benefits of successful breast milk feeding; referred her to the Breastfeeding booklet about this information. She acknowledges understanding of information reviewed and states that it is her plan to breastfeed her infant. Will support her choice and offer additional information as needed. Encouraged mom to attempt feeding with baby led feeding cues. Just as sucking on fingers, rooting, mouthing. Looking for 8-12 feedings in 24 hours. Don't limit baby at breast, allow baby to come of breast on it's own. Baby may want to feed  often and may increase number of feedings on second day of life. Skin to skin encouraged. If baby doesn't nurse,  Mom should  hand express  10-20 drops of colostrum and drip into baby's mouth, or give to baby by finger feeding, cup feeding, or spoon feeding at least every 2-3 hours. Mother will successfully establish breastfeeding by feeding in response to early feeding cues   or wake every 3h, will obtain deep latch, and will keep log of feedings/output. Taught to BF at hunger cues and or q 2-3 hrs and to offer 10-20 drops of hand expressed colostrum at any non-feeds.       Breast Assessment  Left Breast: Medium  Left Nipple: Everted, Intact  Right Breast: Medium  Right Nipple: Everted, Intact  Breast- Feeding Assessment  Attends Breast-Feeding Classes: Yes(Took a parenting class with her mother.)  Breast-Feeding Experience: No  Breast Trauma/Surgery: No  Type/Quality: Good  Lactation Consultant Visits  Breast-Feedings: Good ([facility] present for baby's first feeding. Mother sitting up in bed and receptive. Breastfeeding basics reviewed. Baby was latched on well to right breast and nursing well in cradle hold. )  Mother/Infant Observation  Mother Observation: Alignment, Breast comfortable, Holds breast, Close hold  Infant Observation: Audible swallows, Lips flanged, lower, Lips flanged, upper, Opens mouth, Latches nipple and aereolae, Rhythmic suck  LATCH Documentation  Latch: Grasps breast, tongue down, lips flanged, rhythmic sucking  Audible Swallowing: A few with stimulation  Type of Nipple: Everted (after stimulation)  Comfort (Breast/Nipple): Soft/non-tender  Hold (Positioning): Full assist, teach one side, mother does other, staff holds  LATCH Score: 8  Mother given breastfeeding hand outs and LC#. Instructed mother to call lactation services for any breastfeeding concerns.

## 2019-11-18 NOTE — PROGRESS NOTES
Comfortable with epidural.  No PIH sx's. VS notable for several mild range BP's (systolics in the 643'P)  FHT:  140 baseline, moderate variability, +accels, no decels, Cat I/reactive  [place]:  Ctx's have become more irritability with irregular contractions  Cx exams:   1600 -> 2/90/-2   2015 -> 3/90/-1   2330 -> 5/C/0    A:  28 yo G1 at 41+0. Labor progressing well without augmentation. GBS pos. Reassuring fetal status. Mild range BP's r/o GHTN vs pre-e.    P:  1. Continue to monitor/manage labor  2. Continue PCN GBS proph  3. Anticipate vaginal delivery  4.   [address] [employer] [address] labs

## 2019-11-18 NOTE — ROUTINE PROCESS
Bedside, Verbal and Written shift change report given to SULTANA Méndez RN and NISA Keys  (oncoming nurse) by KAMI Doyle RN (offgoing nurse). Report included the following information SBAR, Kardex, Intake/Output, MAR, Accordion and Recent Results. 4794 - Dr. Daniel Recinos updated on pt status at nurses stationand report that pt is c/o intense intermittent pressure. MD states she is going to change and be in to assess pt.  
 
0730 - Dr. Daniel Recinos at pt bedside for assessment. Performing SVE, pt is complete and +2. MD educating pt on pushing, pt verbalizing understanding pt in lithotomy position feet in stirrups. Pt beginning to push with MD. 
 
0800 - MD leaving bedside. MD remaining at nurses station while pt continues to push with this RN. 
 
7355 - PATRICIA Peters RN requesting penicillin from pharmacy. Raissa Recinos returning to bedside to assess pt progress, this RN continuing to push with pt.  
 
1230 - PATRICIA Peters RN calling pharmacy requesting penicillin, pharmacy confirming penicillin to be sent shortly. 0595- Dr. Daniel Recinos at bedside, pt repositioned to lithotomy position with legs in stirrups tMD remaining at pt perineum for remainder of pt pushing. 1002 -  of viable infant female. Infant dried, stimulated and placed on maternal abdomen. 26 - This RN updating Dr. Daniel Recinos that pt has continued to be tachycardic with HR readings ranging from the 100s-140s, pt bleeding appropriate and pt is asymptomatic TORB for this RN to obtain a BMP, TSH MAG and Phosphorus level and an EKG. Pt to remain on L&D until MD is able to review results. 1419 - This RN PATRICIA Méndez RN and Don GROSSMAN at bedside performing obtaining labs and performing EKG. 1 - Dr. Daniel Recinos at bedside performing pt assessment and perineal assessment, and reviewing EKG and verbalizing all findings are wnl. MD waiting on lab results to determine further plan of care.   MD and pt verbalizing they agree tachycardia is anxiety related. 1 - MD informing this RN at nurses station that lab results do not concern MD. D/t slightly low magnesium MD ordered magnesium oxide PO to be administered to pt at this time, and then pt is cleared to transfer upstairs to MIU. 
 
1600 - TRANSFER - OUT REPORT: 
 
Verbal report given to MINA Langley RN(name) on Albin  being transferred to MIU(unit) for routine progression of care Report consisted of patients Situation, Background, Assessment and  
Recommendations(SBAR). Information from the following report(s) SBAR, Kardex, Intake/Output, MAR, Accordion and Recent Results was reviewed with the receiving nurse. Lines:  
Peripheral IV 11/17/19 Posterior;Right Hand (Active) Site Assessment Clean, dry, & intact 11/18/2019  3:43 PM  
Phlebitis Assessment 0 11/18/2019  3:43 PM  
Infiltration Assessment 0 11/18/2019  3:43 PM  
Dressing Status Clean, dry, & intact 11/18/2019  3:43 PM  
Dressing Type Tape;Transparent 11/18/2019  3:43 PM  
Hub Color/Line Status Pink;Flushed;Capped 11/18/2019  3:43 PM  
Alcohol Cap Used Yes 11/18/2019  3:43 PM  
  
 
Opportunity for questions and clarification was provided. Patient transported with: 
 Registered Nurse

## 2019-11-19 PROBLEM — F84.0 AUTISM SPECTRUM: Status: ACTIVE | Noted: 2019-11-19

## 2019-11-19 PROCEDURE — 74011250637 HC RX REV CODE- 250/637: Performed by: OBSTETRICS & GYNECOLOGY

## 2019-11-19 PROCEDURE — 65270000029 HC RM PRIVATE

## 2019-11-19 RX ADMIN — IBUPROFEN 800 MG: 800 TABLET ORAL at 04:41

## 2019-11-19 RX ADMIN — IBUPROFEN 800 MG: 800 TABLET ORAL at 12:48

## 2019-11-19 RX ADMIN — OXYCODONE HYDROCHLORIDE AND ACETAMINOPHEN 1 TABLET: 5; 325 TABLET ORAL at 14:21

## 2019-11-19 RX ADMIN — Medication 400 MG: at 18:23

## 2019-11-19 RX ADMIN — IBUPROFEN 800 MG: 800 TABLET ORAL at 20:45

## 2019-11-19 RX ADMIN — Medication 400 MG: at 09:00

## 2019-11-19 NOTE — PROGRESS NOTES
Bedside and Verbal shift change report given to Oscar Everett RN (oncoming nurse) by Gabriel Silva RN (offgoing nurse). Report included the following information SBAR, Kardex, MAR and Recent Results.

## 2019-11-19 NOTE — PROGRESS NOTES
Post-Partum Day Number 1 Progress Note    Marry Pineda     Assessment: Active Problems:    Post term pregnancy over 40 weeks (2019)      Normal delivery (2019)      Autism spectrum (2019)      Doing well, post partum day 1, xx  Breast feeding  Tachycardia resolving, nl labs , nl TSH    Plan:  1. Continue routine postpartum and perineal care as well as maternal education. 2. N/A     Information for the patient's :  Gloria Chappell, Female Dinakesha Mckenzie [[id number]]   Vaginal, Spontaneous   Patient doing well without significant complaint. Voiding without difficulty, normal lochia. Current Facility-Administered Medications   Medication Dose Route Frequency    measles, mumps & rubella Vacc (PF) (M-M-R II) injection 0.5 mL  0.5 mL SubCUTAneous PRIOR TO DISCHARGE    ibuprofen (MOTRIN) tablet 800 mg  800 mg Oral Q8H    magnesium oxide (MAG-OX) tablet 400 mg  400 mg Oral BID    sodium chloride (NS) flush 5-40 mL  5-40 mL IntraVENous Q8H    lactated Ringers infusion  125 mL/hr IntraVENous CONTINUOUS    oxytocin (PITOCIN) 30 units/500 ml NS  0-25 rolando-units/min IntraVENous TITRATE       Vitals:  Visit Vitals  /79 (BP 1 Location: Left arm, BP Patient Position: At rest)   Pulse (!) 105   Temp 98 °F (36.7 °C)   Resp 16   Ht 5' 9\" (1.753 m)   Wt 89.4 kg (197 lb)   SpO2 99%   Breastfeeding? Yes   BMI 29.09 kg/m²     Temp (24hrs), Av.1 °F (36.7 °C), Min:97.8 °F (36.6 °C), Max:98.4 °F (36.9 °C)        Exam:   Patient without distress. Abdomen soft, fundus firm, nontender                Perineum with normal lochia noted. Lower extremities are negative for swelling, cords or tenderness.     Labs:     Lab Results   Component Value Date/Time    WBC 8.6 2019 04:19 PM    HGB 13.1 2019 04:19 PM    HCT 39.2 2019 04:19 PM    PLATELET 125  04:19 PM    Hgb, External 12.8 2019    Hct, External 37 2019    Platelet cnt., External 276 2019 Recent Results (from the past 24 hour(s))   PHOSPHORUS    Collection Time: 11/18/19  2:08 PM   Result Value Ref Range    Phosphorus 2.8 2.6 - 4.7 MG/DL   MAGNESIUM, THERAPEUTIC    Collection Time: 11/18/19  2:08 PM   Result Value Ref Range    Therapeutic magnesium 1.7 (L) 4.8 - 8.4 MG/DL   METABOLIC PANEL, BASIC    Collection Time: 11/18/19  2:08 PM   Result Value Ref Range    Sodium 139 136 - 145 mmol/L    Potassium 3.6 3.5 - 5.1 mmol/L    Chloride 111 (H) 97 - 108 mmol/L    CO2 19 (L) 21 - 32 mmol/L    Anion gap 9 5 - 15 mmol/L    Glucose 146 (H) 65 - 100 mg/dL    BUN 7 6 - 20 MG/DL    Creatinine 0.57 0.55 - 1.02 MG/DL    BUN/Creatinine ratio 12 12 - 20      GFR est AA >60 >60 ml/min/1.73m2    GFR est non-AA >60 >60 ml/min/1.73m2    Calcium 8.4 (L) 8.5 - 10.1 MG/DL   TSH 3RD GENERATION    Collection Time: 11/18/19  2:08 PM   Result Value Ref Range    TSH 1.38 0.36 - 3.74 uIU/mL

## 2019-11-19 NOTE — LACTATION NOTE
This note was copied from a baby's chart. Mother states baby has been breastfeeding well. She last breast fed baby an hour ago for 15 minutes. LC reviewed the following:    Reviewed breastfeeding basics:  Supply and demand, breastfeed baby Q 2-3 hr and on demand,   stomach size, early  Feeding cues, skin to skin, positioning and baby led latch-on, assymetrical latch with signs of good, deep latch vs shallow, feeding frequency and duration, and log sheet for tracking infant feedings and output. Breastfeeding Booklet and Warm line information given. Discussed typical  weight loss and the importance of infant weight checks with pediatrician 1-2 post discharge. Discussed what to do if nipples become sore. Care for sore/tender nipples discussed:  ways to improve positioning and latch practiced and discussed, hand express colostrum after feedings and let air dry, light application of lanolin, hydrogel pads, seek comfortable laid back feeding position, start feedings on least sore side      Mother will successfully establish breastfeeding by feeding in response to early feeding cues   or wake every 3h, will obtain deep latch, and will keep log of feedings/output. Taught to BF at hunger cues and or q 2-3 hrs and to offer 10-20 drops of hand expressed colostrum at any non-feeds. Breast Assessment  Left Breast: Medium  Left Nipple: Everted, Intact  Right Breast: Medium  Right Nipple: Everted, Intact  Breast- Feeding Assessment  Attends Breast-Feeding Classes: Yes  Breast-Feeding Experience: No  Breast Trauma/Surgery: No  Type/Quality: Good  Lactation Consultant Visits  Breast-Feedings: (Other states baby last breast fed at 12:15 and baby nursed well for 15 minutes.  Instructed mother to call [id number] when baby breastfeeds again. )

## 2019-11-20 VITALS
HEIGHT: 69 IN | BODY MASS INDEX: 29.18 KG/M2 | DIASTOLIC BLOOD PRESSURE: 73 MMHG | SYSTOLIC BLOOD PRESSURE: 126 MMHG | TEMPERATURE: 98.1 F | OXYGEN SATURATION: 99 % | RESPIRATION RATE: 16 BRPM | HEART RATE: 106 BPM | WEIGHT: 197 LBS

## 2019-11-20 PROCEDURE — 77030021125

## 2019-11-20 PROCEDURE — 74011250637 HC RX REV CODE- 250/637: Performed by: OBSTETRICS & GYNECOLOGY

## 2019-11-20 PROCEDURE — 74011250636 HC RX REV CODE- 250/636: Performed by: OBSTETRICS & GYNECOLOGY

## 2019-11-20 PROCEDURE — 90471 IMMUNIZATION ADMIN: CPT

## 2019-11-20 PROCEDURE — 90686 IIV4 VACC NO PRSV 0.5 ML IM: CPT | Performed by: OBSTETRICS & GYNECOLOGY

## 2019-11-20 RX ORDER — OXYCODONE AND ACETAMINOPHEN 5; 325 MG/1; MG/1
1 TABLET ORAL
Qty: 16 TAB | Refills: 0 | Status: SHIPPED | OUTPATIENT
Start: 2019-11-20 | End: 2019-11-27

## 2019-11-20 RX ORDER — IBUPROFEN 800 MG/1
800 TABLET ORAL
Qty: 60 TAB | Refills: 1 | Status: SHIPPED | OUTPATIENT
Start: 2019-11-20

## 2019-11-20 RX ADMIN — IBUPROFEN 800 MG: 800 TABLET ORAL at 05:00

## 2019-11-20 RX ADMIN — Medication 400 MG: at 11:06

## 2019-11-20 RX ADMIN — OXYCODONE HYDROCHLORIDE AND ACETAMINOPHEN 1 TABLET: 5; 325 TABLET ORAL at 09:18

## 2019-11-20 RX ADMIN — INFLUENZA VIRUS VACCINE 0.5 ML: 15; 15; 15; 15 SUSPENSION INTRAMUSCULAR at 11:07

## 2019-11-20 NOTE — DISCHARGE INSTRUCTIONS
POST DELIVERY DISCHARGE INSTRUCTIONS    Name: Nettie Oneill  YOB: 1995  Primary Diagnosis: Active Problems:    Post term pregnancy over 40 weeks (2019)      Normal delivery (2019)      Autism spectrum (2019)        General:     Diet/Diet Restrictions:  Eight 8-ounce glasses of fluid daily (water, juices); avoid excessive caffeine intake. Meals/snacks as desired which are high in fiber and carbohydrates and low in fat and cholesterol. Physical Activity / Restrictions / Safety:     Avoid heavy lifting, no more that 8 lbs. For 2-3 weeks; limit use of stairs to 2 times daily for the first week home. No driving for one week. Avoid intercourse 4-6 weeks, no douching or tampon use. Check with obstetrician before starting or resuming an exercise program.         Discharge Instructions/Special Treatment/Home Care Needs:     Continue prenatal vitamins. Continue to use squirt bottle with warm water on your episiotomy after each bathroom use until bleeding stops. If steri-strips applied to your incision, remove in 7-10 days. Call your doctor for the following:     Fever over 101 degrees by mouth. Vaginal bleeding heavier than a normal menstrual period or clot larger than a golf ball. Red streaks or increased swelling of legs, painful red streaks on your breast.  Painful urination, constipation and increased pain or swelling or discharge with your incision. If you feel extremely anxious or overwhelmed. If you have thoughts of harming yourself and/or your baby. Pain Management:     Pain Management:   Take Acetaminophen (Tylenol) or Ibuprofen (Advil, Motrin), as directed for pain. Use a warm Sitz bath 3 times daily to relieve episiotomy or hemorrhoidal discomfort. Heating pad to  incision as needed. For hemorrhoidal discomfort, use Tucks and Anusol cream as needed and directed. Follow-Up Care:      These are general instructions for a healthy lifestyle:    No smoking/ No tobacco products/ Avoid exposure to second hand smoke    Surgeon General's Warning:  Quitting smoking now greatly reduces serious risk to your health. Obesity, smoking, and sedentary lifestyle greatly increases your risk for illness    A healthy diet, regular physical exercise & weight monitoring are important for maintaining a healthy lifestyle    Recognize signs and symptoms of STROKE:    F-face looks uneven    A-arms unable to move or move unevenly    S-speech slurred or non-existent    T-time-call 911 as soon as signs and symptoms begin-DO NOT go       Back to bed or wait to see if you get better-TIME IS BRAIN. Patient Education        After Your Delivery (the Postpartum Period): Care Instructions  Your Care Instructions    Congratulations on the birth of your baby. Like pregnancy, the  period can be a time of excitement, ankur, and exhaustion. You may look at your wondrous little baby and feel happy. You may also be overwhelmed by your new sleep hours and new responsibilities. At first, babies often sleep during the days and are awake at night. They do not have a pattern or routine. They may make sudden gasps, jerk themselves awake, or look like they have crossed eyes. These are all normal, and they may even make you smile. In these first weeks after delivery, try to take good care of yourself. It may take 4 to 6 weeks to feel like yourself again, and possibly longer if you had a  birth. You will likely feel very tired for several weeks. Your days will be full of ups and downs, but lots of ankur as well. Follow-up care is a key part of your treatment and safety. Be sure to make and go to all appointments, and call your doctor if you are having problems. It's also a good idea to know your test results and keep a list of the medicines you take. How can you care for yourself at home?   Take care of your body after delivery  · Use pads instead of tampons for the bloody flow that may last as long as 2 weeks. · Ease cramps with ibuprofen (Advil, Motrin). · Ease soreness of hemorrhoids and the area between your vagina and rectum with ice compresses or witch hazel pads. · Ease constipation by drinking lots of fluid and eating high-fiber foods. Ask your doctor about over-the-counter stool softeners. · Cleanse yourself with a gentle squeeze of warm water from a bottle instead of wiping with toilet paper. · Take a sitz bath in warm water several times a day. · Wear a good nursing bra. Ease sore and swollen breasts with warm, wet washcloths. · If you are not breastfeeding, use ice rather than heat for breast soreness. · Your period may not start for several months if you are breastfeeding. You may bleed more, and longer at first, than you did before you got pregnant. · Wait until you are healed (about 4 to 6 weeks) before you have sexual intercourse. Your doctor will tell you when it is okay to have sex. · Try not to travel with your baby for 5 or 6 weeks. If you take a long car trip, make frequent stops to walk around and stretch. Avoid exhaustion  · Rest every day. Try to nap when your baby naps. · Ask another adult to be with you for a few days after delivery. · Plan for  if you have other children. · Stay flexible so you can eat at odd hours and sleep when you need to. Both you and your baby are making new schedules. · Plan small trips to get out of the house. Change can make you feel less tired. · Ask for help with housework, cooking, and shopping. Remind yourself that your job is to care for your baby. Know about help for postpartum depression  · \"Baby blues\" are common for the first 1 to 2 weeks after birth. You may cry or feel sad or irritable for no reason. · Rest whenever you can. Being tired makes it harder to handle your emotions. · Go for walks with your baby. · Talk to your partner, friends, and family about your feelings.   · If your symptoms last for more than a few weeks, or if you feel very depressed, ask your doctor for help. · Postpartum depression can be treated. Support groups and counseling can help. Sometimes medicine can also help. Stay healthy  · Eat healthy foods so you have more energy and lose extra baby pounds. · If you breastfeed, avoid drugs. If you quit smoking during pregnancy, try to stay smoke-free. If you choose to have a drink now and then, have only one drink, and limit the number of occasions that you have a drink. Wait to breastfeed at least 2 hours after you have a drink to reduce the amount of alcohol the baby may get in the milk. · Start daily exercise after 4 to 6 weeks, but rest when you feel tired. · Learn exercises to tone your belly. Do Kegel exercises to regain strength in your pelvic muscles. You can do these exercises while you stand or sit. ? Squeeze the same muscles you would use to stop your urine. Your belly and thighs should not move. ? Hold the squeeze for 3 seconds, and then relax for 3 seconds. ? Start with 3 seconds. Then add 1 second each week until you are able to squeeze for 10 seconds. ? Repeat the exercise 10 to 15 times for each session. Do three or more sessions each day. · Find a class for new mothers and new babies that has an exercise time. · If you had a  birth, give yourself a bit more time before you exercise, and be careful. When should you call for help? Call 911 anytime you think you may need emergency care. For example, call if:    · You have thoughts of harming yourself, your baby, or another person.     · You passed out (lost consciousness).     · You have chest pain, are short of breath, or cough up blood.     · You have a seizure.    Call your doctor now or seek immediate medical care if:    · You have severe vaginal bleeding.  This means you are passing blood clots and soaking through a pad each hour for 2 or more hours.     · You are dizzy or lightheaded, or you feel like you may faint.     · You have a fever.     · You have new or more belly pain.     · You have signs of a blood clot in your leg (called a deep vein thrombosis), such as:  ? Pain in the calf, back of the knee, thigh, or groin. ? Redness and swelling in your leg or groin.     · You have signs of preeclampsia, such as:  ? Sudden swelling of your face, hands, or feet. ? New vision problems (such as dimness, blurring, or seeing spots). ? A severe headache.    Watch closely for changes in your health, and be sure to contact your doctor if:    · Your vaginal bleeding seems to be getting heavier.     · You have new or worse vaginal discharge.     · You feel sad, anxious, or hopeless for more than a few days.     · You do not get better as expected. Where can you learn more? Go to http://ly-miguel angel.info/. Enter A461 in the search box to learn more about \"After Your Delivery (the Postpartum Period): Care Instructions. \"  Current as of: May 29, 2019  Content Version: 12.2  © 6478-5371 [employer]. Care instructions adapted under license by [employer] (which disclaims liability or warranty for this information). If you have questions about a medical condition or this instruction, always ask your healthcare professional. [address] any warranty or liability for your use of this information.

## 2019-11-20 NOTE — PROGRESS NOTES
Spoke to Dr Jett Joes regarding po magnesium oxide. New orders given to discontinue magnesium oxide. New orders for patient to follow up in 1-2 weeks for vitals checkup. Per Dr Jett Jose, office will call patient to schedule follow up appt.

## 2019-11-20 NOTE — PROGRESS NOTES
Post-Partum Day Number 2 Progress Note    [facility]     Assessment: Doing well, post partum day 2  Breast feeding  Tachycardia resolving, nl labs , nl TSH  Pre-eclampsia without severe features- blood pressure normotensive, BP check in 1-2 weeks     Plan:   1. Discharge home today  2. Follow up in office in 6 weeks with Mavis Oconnor MD  3. Post partum activity advised, diet as tolerated  4. Discharge Medications: ibuprofen, percocet and medications prior to admission    Information for the patient's :  Taylor Mandel [[id number]]   Vaginal, Spontaneous   Patient doing well without significant complaint. Voiding without difficulty, normal lochia. Vitals:  Visit Vitals  /73   Pulse (!) 106   Temp 98.1 °F (36.7 °C)   Resp 16   Ht 5' 9\" (1.753 m)   Wt 89.4 kg (197 lb)   SpO2 99%   Breastfeeding Yes   BMI 29.09 kg/m²     Temp (24hrs), Av °F (36.7 °C), Min:97.6 °F (36.4 °C), Max:98.3 °F (36.8 °C)      Exam:         Patient without distress. Abdomen soft, fundus firm, nontender                 Lower extremities are negative for swelling, cords or tenderness. Labs:     Lab Results   Component Value Date/Time    WBC 8.6 2019 04:19 PM    HGB 13.1 2019 04:19 PM    HCT 39.2 2019 04:19 PM    PLATELET 450  04:19 PM    Hgb, External 12.8 2019    Hct, External 37 2019    Platelet cnt., External 276 2019       No results found for this or any previous visit (from the past 24 hour(s)).

## 2019-11-20 NOTE — LACTATION NOTE
This note was copied from a baby's chart. Mom states breast feeding going well. Not seen at breast.  Discussed breast feeding discharge information with parents. Breast Feeding Discharge Information    Chart shows numerous feedings, void, stool WNL. Discussed Importance of monitoring outputs and feedings on first week of  Breastfeeding. Discussed ways to tell if baby getting enough, ie  Voids and stools, by day 7, baby should have at least  4-6 wet diapers a day, change in color of stool to a seedy yellow, and return to birth wt within 2 weeks with a steady increase after that. .  Follow up with pediatrician visit for weight check in 1-2 days reviewed. Discussed Breast feeding support groups and encouraged to call Warm line number, [phone]  for any breast feeding questions or problems that arise. Please leave a message and let us know what is going on. We will return your call within 24 hours. Breast feeding Support groups meet at [address] [place] [address] the first and third Wednesday of the month from 11-12 noon. Meetings are held in a classroom past the cafeteria on the first floor. Feedings  Encouraged mom to attempt feeding with baby led feeding cues. Just as sucking on fingers, rooting, mouthing. Looking for 8-12 feedings in 24 hours. Don't limit baby at breast, allow baby to come off breast on it's own. Baby may want to feed  often and may increase number of feedings on second day of life. Skin to skin encouraged. In 4-6 weeks, baby may go though a growth spurt and increase feedings for several days to increase your milk supply. If baby doesn't nurse,  Mom should Pump or hand express drops, 12-18 drops, and give infant any expressed milk. If not pumping any milk, mom should contact pediatrician for possible need for supplementation. MOM's DIET    Discussed eating a healthy diet. Instructed mother to eat a variety of foods in order to get a well balanced diet.  She should consume an extra 300-500 calories per day (more than her non-pregnant requirement.) These extra calories will help provide energy needed for optimal breast milk production. Mother also encouraged to \"drink to thirst\" and it is recommended that she drink fluids such as water and fruit/vegetable juice. Nutritious snacks should be available so that she can eat throughout the day to help satisfy her hunger and maintain a good milk supply. Continue taking your Prenatal vitamins as long as you breast feed. Engorgement Care Guidelines:  Anticipatory guidance shared. If breast become engorged, to help decrease engorgement. Frequent breastfeeding encouraged, cool packs around breast after nursing may help. May take motrin or Ibuprofen as ordered by your Doctor.       Call your doctor, midwife and/or lactation consultant if:   Nhung Correa is having no wet or dirty diapers    Baby has dark colored urine after day 3  (should be pale yellow to clear)    Baby has dark colored stools after day 4  (should be mustard yellow, with no meconium)    Baby has fewer wet/soiled diapers or nurses less   frequently than the goals listed here    Mom has symptoms of mastitis   (sore breast with fever, chills, flu-like aching)

## 2019-11-20 NOTE — DISCHARGE SUMMARY
Obstetrical Discharge Summary     Name: Aguila Solis MRN: [id number]  SSN: [id number]    YOB: 1995  Age: 27 y.o. Sex: female      Admit Date: 2019    Discharge Date: 2019     Admitting Physician: Mic Moses MD     Attending Physician:  Stacey Bailey MD     Admission Diagnoses: Post term pregnancy over 40 weeks [O48.0]    Discharge Diagnoses:   Information for the patient's :  [facility], Female Khushbu Le [[id number]]   Delivery of a 4.366 kg female infant via Vaginal, Spontaneous on 2019 at 10:02 AM  by Luz Andrews. Apgars were 9  and 9 . Additional Diagnoses:   Hospital Problems  Date Reviewed: 2019          Codes Class Noted POA    Normal delivery ICD-10-CM: O80  ICD-9-CM: 632  2019 Unknown        Autism spectrum ICD-10-CM: F84.0  ICD-9-CM: 299.00  2019 Unknown        Post term pregnancy over 40 weeks ICD-10-CM: O48.0  ICD-9-CM: 645.10  2019 Unknown             Lab Results   Component Value Date/Time    Rubella, External Immune 2019    GrBStrep, External POSITIVE 10/14/2019       Hospital Course: Normal hospital course following the delivery. Disposition at Discharge: Home or self care    Discharged Condition: Stable    Patient Instructions:   Current Discharge Medication List      START taking these medications    Details   ibuprofen (MOTRIN) 800 mg tablet Take 1 Tab by mouth every eight (8) hours as needed for Pain. Qty: 60 Tab, Refills: 1      oxyCODONE-acetaminophen (PERCOCET) 5-325 mg per tablet Take 1 Tab by mouth every six (6) hours as needed for Pain for up to 7 days. Max Daily Amount: 4 Tabs. Qty: 16 Tab, Refills: 0    Associated Diagnoses: Normal delivery         CONTINUE these medications which have NOT CHANGED    Details   PNV No12-Iron-FA-DSS-OM-3 29 mg iron-1 mg -50 mg CPKD Take  by mouth.          STOP taking these medications       ferrous sulfate (IRON) 325 mg (65 mg iron) tablet Comments:   Reason for Stopping: Reference my discharge instructions.     Follow-up Appointments   Procedures    FOLLOW UP VISIT Appointment in: 6 Weeks     Standing Status:   Standing     Number of Occurrences:   1     Order Specific Question:   Appointment in     Answer:   6 Weeks        Signed By:  Laz Hernandez MD     November 20, 2019

## 2019-11-20 NOTE — PROGRESS NOTES
Bedside and Verbal shift change report given to Reed Fonseca RN (oncoming nurse) by Gabriel Silva RN (offgoing nurse). Report included the following information SBAR, Kardex, Intake/Output, MAR and Recent Results.

## 2019-11-20 NOTE — PROGRESS NOTES
11/20/19 12:03 PM  CM met with EARLENE and her /FOB Adrian Boyd ([phone]) to complete initial assessment and to begin discharge planning. Demographics were reviewed and confirmed; the couple lives together in an apartment in [place] with FOB's 24year old sister. MOB does not work outside the home, FOB works at [employer] and as a  at a [employer] in [place]. The couple moved to the area in September from [place]. EARLENE's mother resides in [place] and will be in town for the next 2 weeks to assist as needed. EARLENE is breastfeeding and has a pump to use at home. [facility] location) will provide medical follow up for the baby; an appointment is scheduled for 11/21/19 at 10:40 AM.  Patient has car seat, crib, clothing, and other necessary supplies. She has Medicaid services and was educated on how to add baby. Discussed WIC, parents interested and will call today for an enrollment appointment; CM provided contact info for local [facility] office. JOVONB to drive home at discharge today. Care Management Interventions  PCP Verified by CM:  Yes  Mode of Transport at Discharge: [address] (CM Consult): Discharge Planning  Current Support Network: Lives with Spouse  Confirm Follow Up Transport: Family  Plan discussed with Pt/Family/Caregiver: Yes  Freedom of Choice Offered: Yes  Discharge Location  Discharge Placement: Home with outpatient services  JENNIFER Coy

## 2019-11-20 NOTE — PROGRESS NOTES
Patient discharged to home with infant and significant other. Infant in carseat. Patient in wheelchair. Prescriptions given x2 (ibuprofen, percocet) . Discharge instructions reviewed with patient and significant other, signed by patient, and copies given. Per patient and significant other, no questions. Patient and infant bands verified. See infant note and/or footprint sheet on chart.

## 2019-11-29 LAB
ATRIAL RATE: 104 BPM
CALCULATED P AXIS, ECG09: 41 DEGREES
CALCULATED R AXIS, ECG10: 55 DEGREES
CALCULATED T AXIS, ECG11: 44 DEGREES
DIAGNOSIS, 93000: NORMAL
P-R INTERVAL, ECG05: 136 MS
Q-T INTERVAL, ECG07: 346 MS
QRS DURATION, ECG06: 94 MS
QTC CALCULATION (BEZET), ECG08: 454 MS
VENTRICULAR RATE, ECG03: 104 BPM

## 2022-05-17 ENCOUNTER — APPOINTMENT (OUTPATIENT)
Dept: ULTRASOUND IMAGING | Age: 27
End: 2022-05-17
Payer: COMMERCIAL

## 2022-05-17 ENCOUNTER — HOSPITAL ENCOUNTER (EMERGENCY)
Age: 27
Discharge: HOME | End: 2022-05-17
Attending: EMERGENCY MEDICINE
Payer: COMMERCIAL

## 2022-05-17 VITALS
RESPIRATION RATE: 20 BRPM | DIASTOLIC BLOOD PRESSURE: 58 MMHG | TEMPERATURE: 100.1 F | WEIGHT: 153 LBS | BODY MASS INDEX: 22.59 KG/M2 | OXYGEN SATURATION: 99 % | SYSTOLIC BLOOD PRESSURE: 102 MMHG | HEART RATE: 105 BPM

## 2022-05-17 DIAGNOSIS — R11.2 NAUSEA AND VOMITING, UNSPECIFIED VOMITING TYPE: ICD-10-CM

## 2022-05-17 DIAGNOSIS — N39.0 URINARY TRACT INFECTION WITHOUT HEMATURIA, SITE UNSPECIFIED: Primary | ICD-10-CM

## 2022-05-17 DIAGNOSIS — Z3A.16 16 WEEKS GESTATION OF PREGNANCY: ICD-10-CM

## 2022-05-17 LAB
ALBUMIN SERPL-MCNC: 3.5 G/DL (ref 3.5–5)
ALBUMIN/GLOB SERPL: 1 {RATIO} (ref 1.1–2.2)
ALP SERPL-CCNC: 79 U/L (ref 45–117)
ALT SERPL-CCNC: 64 U/L (ref 12–78)
ANION GAP SERPL CALC-SCNC: 13 MMOL/L (ref 5–15)
APPEARANCE UR: ABNORMAL
AST SERPL-CCNC: 58 U/L (ref 15–37)
BACTERIA URNS QL MICRO: ABNORMAL /HPF
BASOPHILS # BLD: 0 K/UL (ref 0–0.1)
BASOPHILS NFR BLD: 0 % (ref 0–1)
BILIRUB SERPL-MCNC: 0.4 MG/DL (ref 0.2–1)
BILIRUB UR QL: NEGATIVE
BUN SERPL-MCNC: 7 MG/DL (ref 6–20)
BUN/CREAT SERPL: 15 (ref 12–20)
CALCIUM SERPL-MCNC: 8.8 MG/DL (ref 8.5–10.1)
CHLORIDE SERPL-SCNC: 102 MMOL/L (ref 97–108)
CO2 SERPL-SCNC: 21 MMOL/L (ref 21–32)
COLOR UR: ABNORMAL
COMMENT, HOLDF: NORMAL
CREAT SERPL-MCNC: 0.48 MG/DL (ref 0.55–1.02)
DIFFERENTIAL METHOD BLD: ABNORMAL
EOSINOPHIL # BLD: 0 K/UL (ref 0–0.4)
EOSINOPHIL NFR BLD: 0 % (ref 0–7)
EPITH CASTS URNS QL MICRO: ABNORMAL /LPF
ERYTHROCYTE [DISTWIDTH] IN BLOOD BY AUTOMATED COUNT: 13.1 % (ref 11.5–14.5)
FLUAV AG NPH QL IA: NEGATIVE
FLUBV AG NOSE QL IA: NEGATIVE
GLOBULIN SER CALC-MCNC: 3.6 G/DL (ref 2–4)
GLUCOSE SERPL-MCNC: 85 MG/DL (ref 65–100)
GLUCOSE UR STRIP.AUTO-MCNC: NEGATIVE MG/DL
HCG SERPL-ACNC: ABNORMAL MIU/ML (ref 0–6)
HCT VFR BLD AUTO: 34.7 % (ref 35–47)
HGB BLD-MCNC: 11.9 G/DL (ref 11.5–16)
HGB UR QL STRIP: NEGATIVE
IMM GRANULOCYTES # BLD AUTO: 0 K/UL (ref 0–0.04)
IMM GRANULOCYTES NFR BLD AUTO: 0 % (ref 0–0.5)
KETONES UR QL STRIP.AUTO: >80 MG/DL
LEUKOCYTE ESTERASE UR QL STRIP.AUTO: ABNORMAL
LYMPHOCYTES # BLD: 0.3 K/UL (ref 0.8–3.5)
LYMPHOCYTES NFR BLD: 6 % (ref 12–49)
MCH RBC QN AUTO: 29.1 PG (ref 26–34)
MCHC RBC AUTO-ENTMCNC: 34.3 G/DL (ref 30–36.5)
MCV RBC AUTO: 84.8 FL (ref 80–99)
MONOCYTES # BLD: 0.5 K/UL (ref 0–1)
MONOCYTES NFR BLD: 9 % (ref 5–13)
NEUTS SEG # BLD: 4.6 K/UL (ref 1.8–8)
NEUTS SEG NFR BLD: 85 % (ref 32–75)
NITRITE UR QL STRIP.AUTO: NEGATIVE
NRBC # BLD: 0 K/UL (ref 0–0.01)
NRBC BLD-RTO: 0 PER 100 WBC
PH UR STRIP: 6 [PH] (ref 5–8)
PLATELET # BLD AUTO: 199 K/UL (ref 150–400)
PMV BLD AUTO: 9.8 FL (ref 8.9–12.9)
POTASSIUM SERPL-SCNC: 3.7 MMOL/L (ref 3.5–5.1)
PROT SERPL-MCNC: 7.1 G/DL (ref 6.4–8.2)
PROT UR STRIP-MCNC: ABNORMAL MG/DL
RBC # BLD AUTO: 4.09 M/UL (ref 3.8–5.2)
RBC #/AREA URNS HPF: ABNORMAL /HPF (ref 0–5)
RBC MORPH BLD: ABNORMAL
SAMPLES BEING HELD,HOLD: NORMAL
SARS-COV-2, COV2: NORMAL
SODIUM SERPL-SCNC: 136 MMOL/L (ref 136–145)
SP GR UR REFRACTOMETRY: 1.03 (ref 1–1.03)
UR CULT HOLD, URHOLD: NORMAL
UROBILINOGEN UR QL STRIP.AUTO: 0.2 EU/DL (ref 0.2–1)
WBC # BLD AUTO: 5.4 K/UL (ref 3.6–11)
WBC URNS QL MICRO: ABNORMAL /HPF (ref 0–4)

## 2022-05-17 PROCEDURE — 80053 COMPREHEN METABOLIC PANEL: CPT

## 2022-05-17 PROCEDURE — 87086 URINE CULTURE/COLONY COUNT: CPT

## 2022-05-17 PROCEDURE — 74011250636 HC RX REV CODE- 250/636: Performed by: PHYSICIAN ASSISTANT

## 2022-05-17 PROCEDURE — 99284 EMERGENCY DEPT VISIT MOD MDM: CPT

## 2022-05-17 PROCEDURE — 87804 INFLUENZA ASSAY W/OPTIC: CPT

## 2022-05-17 PROCEDURE — U0005 INFEC AGEN DETEC AMPLI PROBE: HCPCS

## 2022-05-17 PROCEDURE — 96374 THER/PROPH/DIAG INJ IV PUSH: CPT

## 2022-05-17 PROCEDURE — 36415 COLL VENOUS BLD VENIPUNCTURE: CPT

## 2022-05-17 PROCEDURE — 85025 COMPLETE CBC W/AUTO DIFF WBC: CPT

## 2022-05-17 PROCEDURE — 76815 OB US LIMITED FETUS(S): CPT

## 2022-05-17 PROCEDURE — 84702 CHORIONIC GONADOTROPIN TEST: CPT

## 2022-05-17 PROCEDURE — 96361 HYDRATE IV INFUSION ADD-ON: CPT

## 2022-05-17 PROCEDURE — 81001 URINALYSIS AUTO W/SCOPE: CPT

## 2022-05-17 RX ORDER — ONDANSETRON 4 MG/1
4 TABLET, FILM COATED ORAL
Qty: 15 TABLET | Refills: 0 | Status: SHIPPED | OUTPATIENT
Start: 2022-05-17

## 2022-05-17 RX ORDER — ONDANSETRON 2 MG/ML
4 INJECTION INTRAMUSCULAR; INTRAVENOUS
Status: COMPLETED | OUTPATIENT
Start: 2022-05-17 | End: 2022-05-17

## 2022-05-17 RX ORDER — CEPHALEXIN 500 MG/1
500 CAPSULE ORAL 2 TIMES DAILY
Qty: 14 CAPSULE | Refills: 0 | Status: SHIPPED | OUTPATIENT
Start: 2022-05-17 | End: 2022-05-24

## 2022-05-17 RX ORDER — ESCITALOPRAM OXALATE 10 MG/1
10 TABLET ORAL DAILY
COMMUNITY

## 2022-05-17 RX ADMIN — ONDANSETRON HYDROCHLORIDE 4 MG: 2 SOLUTION INTRAMUSCULAR; INTRAVENOUS at 15:55

## 2022-05-17 RX ADMIN — SODIUM CHLORIDE 1000 ML: 9 INJECTION, SOLUTION INTRAVENOUS at 15:55

## 2022-05-17 NOTE — ED NOTES
1517 Pt reports loss of 20-30 lbs since 12 weeks of pregnancy. Pt reports vomiting once every AM. Pt was given Vitamin B6 but N/V unrelieved and has continued to vomit. Pt reports unable to tolerate any PO intake and was told by OBGYN if unable to keep anything down to come to the ER.

## 2022-05-17 NOTE — ED PROVIDER NOTES
Date of Service:  2022    Patient:  [facility]    Chief Complaint:  Vomiting and Pregnancy Problem       HPI:  [facility] is a   24 y.o.  female who presents for evaluation of nausea and vomiting x2 days. Patient states that she has been unable to tolerate food and drink. Patient states that she may have had a fever yesterday. Patient states that she has a sick child at home who she has been keeping with nasal congestion, but no vomiting. Patient states that she is 16 weeks pregnant. Patient denies any abdominal pain, chest pain, or shortness of breath. Patient denies vaginal bleeding or urinary symptoms. Past Medical History:   Diagnosis Date    Abnormal Papanicolaou smear of cervix     ADHD     Anemia     Autism     HPV (human papilloma virus) anogenital infection        Past Surgical History:   Procedure Laterality Date    HX OTHER SURGICAL      LEEP IN          History reviewed. No pertinent family history.     Social History     Socioeconomic History    Marital status:      Spouse name: Not on file    Number of children: Not on file    Years of education: Not on file    Highest education level: Not on file   Occupational History    Not on file   Tobacco Use    Smoking status: Never Smoker    Smokeless tobacco: Never Used   Substance and Sexual Activity    Alcohol use: Never    Drug use: Never    Sexual activity: Yes     Partners: Male   Other Topics Concern     Service Not Asked    Blood Transfusions Not Asked    Caffeine Concern Not Asked    Occupational Exposure Not Asked    Hobby Hazards Not Asked    Sleep Concern Not Asked    Stress Concern Not Asked    Weight Concern Not Asked    Special Diet Not Asked    Back Care Not Asked    Exercise Not Asked    Bike Helmet Not Asked    [address] Not Asked    Self-Exams Not Asked   Social History Narrative    Not on file     Social Determinants of Health     Financial Resource Strain:  Difficulty of Paying Living Expenses: Not on file   Food Insecurity:     Worried About Running Out of Food in the Last Year: Not on file    Ran Out of Food in the Last Year: Not on file   Transportation Needs:     Lack of Transportation (Medical): Not on file    Lack of Transportation (Non-Medical): Not on file   Physical Activity:     Days of Exercise per Week: Not on file    Minutes of Exercise per Session: Not on file   Stress:     Feeling of Stress : Not on file   Social Connections:     Frequency of Communication with Friends and Family: Not on file    Frequency of Social Gatherings with Friends and Family: Not on file    Attends Zoroastrian Services: Not on file    Active Member of [address] [place] [address] [employer] or Organizations: Not on file    Attends Club or Organization Meetings: Not on file    Marital Status: Not on file   Intimate Partner Violence:     Fear of Current or Ex-Partner: Not on file    Emotionally Abused: Not on file    Physically Abused: Not on file    Sexually Abused: Not on file   Housing Stability:     Unable to Pay for Housing in the Last Year: Not on file    Number of [place] in the Last Year: Not on file    Unstable Housing in the Last Year: Not on file         ALLERGIES: Patient has no known allergies. Review of Systems   Constitutional: Negative for chills and fever. Respiratory: Negative for shortness of breath. Cardiovascular: Negative for chest pain. Gastrointestinal: Positive for nausea and vomiting. Negative for abdominal pain. Genitourinary: Negative for dysuria. Skin: Negative for rash. Allergic/Immunologic: Negative for immunocompromised state. Neurological: Negative for headaches. Psychiatric/Behavioral: Negative for agitation. All other systems reviewed and are negative.       Vitals:    05/17/22 1925 05/17/22 1955 05/17/22 2025 05/17/22 2055   BP: 109/60 (!) 113/57 111/68 (!) 102/58   Pulse:       Resp:       Temp:       SpO2: 99% 100% 100% 99% Weight:                Physical Exam  Vitals and nursing note reviewed. Constitutional:       General: She is not in acute distress. Cardiovascular:      Rate and Rhythm: Normal rate and regular rhythm. Heart sounds: No murmur heard. Pulmonary:      Effort: Pulmonary effort is normal.      Breath sounds: Normal breath sounds. Abdominal:      Palpations: Abdomen is soft. Tenderness: There is no abdominal tenderness. Musculoskeletal:         General: Normal range of motion. Cervical back: Normal range of motion. Skin:     General: Skin is warm. Neurological:      Mental Status: She is alert and oriented to person, place, and time. Mental status is at baseline. MDM       Procedures      VITAL SIGNS:  No data found. LABS:  No results found for this or any previous visit (from the past 6 hour(s)). IMAGING:  US PREG UTS LTD   Final Result   Single viable intrauterine gestation with a mean menstrual age of 14   weeks 5 days +/- 1 week. Limited as the cervical length was not measured. We   would be happy to supplement this exam at no charge. Medications During Visit:  Medications   ondansetron (ZOFRAN) injection 4 mg (4 mg IntraVENous Given 5/17/22 5180)   sodium chloride 0.9 % bolus infusion 1,000 mL (0 mL IntraVENous IV Completed 5/17/22 1700)         DECISION MAKING:  Chasidy Gardner is a 24 y.o. female who comes in as above. Lab work-up was unremarkable. UA showed evidence of urinary tract infection. Ultrasound was unremarkable. Patient states some improvement after receiving 1 L of fluids and Zofran for nausea. P.o. challenge for patient. Patient was able to tolerate cookies and water while in the ER. Consult with on-call OB/GYN. Discussed case with Dr Daniel Murdock. Recommendation was that patient was stable for discharge due to no electrolyte abnormalities.   Dr. Daniel Murdock stated that her office will contact patient Tuesday to schedule a follow-up visit this week. IMPRESSION:  1. Urinary tract infection without hematuria, site unspecified    2. Nausea and vomiting, unspecified vomiting type    3. 16 weeks gestation of pregnancy        DISPOSITION:  Discharged      Discharge Medication List as of 5/17/2022  8:43 PM      START taking these medications    Details   cephALEXin (Keflex) 500 mg capsule Take 1 Capsule by mouth two (2) times a day for 7 days. , Print, Disp-14 Capsule, R-0      ondansetron hcl (Zofran) 4 mg tablet Take 1 Tablet by mouth every eight (8) hours as needed for Nausea or Nausea or Vomiting., Print, Disp-15 Tablet, R-0         CONTINUE these medications which have NOT CHANGED    Details   escitalopram oxalate (Lexapro) 10 mg tablet Take 10 mg by mouth daily. , Historical Med      ibuprofen (MOTRIN) 800 mg tablet Take 1 Tab by mouth every eight (8) hours as needed for Pain., Print, Disp-60 Tab, R-1      PNV No12-Iron-FA-DSS-OM-3 29 mg iron-1 mg -50 mg CPKD Take  by mouth., Historical Med              Follow-up Information     Follow up With Specialties Details Why Contact Info    [address]  Schedule an appointment as soon as possible for a visit   [address]  [address]    [address] Emergency Medicine Schedule an appointment as soon as possible for a visit   [address] [place] [address] [phone]            The patient is asked to follow-up with their primary care provider in the next several days. They are to call tomorrow for an appointment. The patient is asked to return promptly for any increased concerns or worsening of symptoms. They can return to this emergency department or any other emergency department.

## 2022-05-17 NOTE — ED TRIAGE NOTES
Pt with vomiting x 1 day. Pt also with congestion. Pt 16 weeks pregnant and her OB is concerned for weight loss. Possible fever yesterday. Pt child also sick with congestion, no vomiting.

## 2022-05-17 NOTE — ED NOTES
Verbal shift change report given to Terri Tyson RN (oncoming nurse) by Luis Gonzalez RN (offgoing nurse). Report included the following information Kardex, ED Summary, MAR, Recent Results and Med Rec Status.

## 2022-05-18 LAB
SARS-COV-2, XPLCVT: NOT DETECTED
SOURCE, COVRS: NORMAL

## 2022-05-18 NOTE — ED NOTES
Assumed care of patient. Pt states nausea relieved with meds given. States ready for discharge. Will make MD aware.

## 2022-05-19 LAB
BACTERIA SPEC CULT: NORMAL
CC UR VC: NORMAL
SERVICE CMNT-IMP: NORMAL